# Patient Record
(demographics unavailable — no encounter records)

---

## 2025-05-27 NOTE — HISTORY OF PRESENT ILLNESS
[FreeTextEntry1] : annual cpe [de-identified] : 48 yo  female saw GYN for perimenopause, planning to start HRT will schedule colonoscopy for over the summer JACKELYN recently dx with progressive breast can going to start progesterone ela casillas

## 2025-05-27 NOTE — HEALTH RISK ASSESSMENT
[Very Good] : ~his/her~  mood as very good [2 - 4 times a month (2 pts)] : 2-4 times a month (2 points) [1 or 2 (0 pts)] : 1 or 2 (0 points) [Never (0 pts)] : Never (0 points) [No] : In the past 12 months have you used drugs other than those required for medical reasons? No [No falls in past year] : Patient reported no falls in the past year [0] : 2) Feeling down, depressed, or hopeless: Not at all (0) [PHQ-2 Negative - No further assessment needed] : PHQ-2 Negative - No further assessment needed [Never] : Never [NO] : No [Patient reported mammogram was normal] : Patient reported mammogram was normal [Patient reported PAP Smear was normal] : Patient reported PAP Smear was normal [HIV test declined] : HIV test declined [Hepatitis C test declined] : Hepatitis C test declined [Graduate School] : graduate school [] :  [Sexually Active] : sexually active [Feels Safe at Home] : Feels safe at home [Fully functional (bathing, dressing, toileting, transferring, walking, feeding)] : Fully functional (bathing, dressing, toileting, transferring, walking, feeding) [Fully functional (using the telephone, shopping, preparing meals, housekeeping, doing laundry, using] : Fully functional and needs no help or supervision to perform IADLs (using the telephone, shopping, preparing meals, housekeeping, doing laundry, using transportation, managing medications and managing finances) [Reports normal functional visual acuity (ie: able to read med bottle)] : Reports normal functional visual acuity [# Of Children ___] : has [unfilled] children [Audit-CScore] : 2 [de-identified] : active [de-identified] : healthy [NDK1Upxpy] : 0 [Change in mental status noted] : No change in mental status noted [High Risk Behavior] : no high risk behavior [Reports changes in hearing] : Reports no changes in hearing [Reports changes in vision] : Reports no changes in vision [MammogramDate] : 2025 [PapSmearDate] : 2025 [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

## 2025-06-06 NOTE — HISTORY OF PRESENT ILLNESS
[FreeTextEntry1] : This is a pleasant 47-year-old female who requested a telehealth consult for screening colonoscopy. She denies any significant past medical or surgical history. She denies ever having a lower GI evaluation before. She denies any family history of colon cancer or polyps. She reports that her maternal grandmother  from cancer but is unsure if it was colon cancer. She denies any change in bowel habits. She denies any diarrhea or constipation. She denies any rectal bleeding or melena. She denies any abdominal pain, unintentional weight loss or history of anemia. She denies any upper GI symptoms. She denies the use of any prescription medications; however, she does take over the counter vitamins.

## 2025-06-06 NOTE — ASSESSMENT
[FreeTextEntry1] : This is a pleasant 47-year-old female who requested a telehealth consult for screening colonoscopy. She denies any significant past medical or surgical history. She denies ever having a lower GI evaluation before. She denies any family history of colon cancer or polyps. She reports that her maternal grandmother  from cancer but is unsure if it was colon cancer. She denies any change in bowel habits. She denies any diarrhea or constipation. She denies any rectal bleeding or melena. She denies any abdominal pain, unintentional weight loss or history of anemia. She denies any upper GI symptoms. She denies the use of any prescription medications; however, she does take over the counter vitamins.   Mrs. Burks is an appropriate candidate for a screening colonoscopy. I explained to her the risks, alternatives, and benefits to a colonoscopy. Risk including but not limited to bleeding, perforation, infection and adverse medication reaction. I informed her that I will be sending a prescription for colon prep Suprep to the pharmacy on file. I also discussed with her diet and colon prep instructions prior to the procedure. All instruction will be sent to the patient. Questions were answered. She stated understanding to the above and is agreeable to proceed with the planned procedure. Patient advised to call back the office if Suprep is not covered by her insurance so that an alternative can be prescribed, or with any further questions or concerns.

## 2025-06-06 NOTE — REASON FOR VISIT
[Home] : at home, [unfilled] , at the time of the visit. [Medical Office: ([facility])___] : at the medical office located in  [Telehealth (audio & video)] : This visit was provided via telehealth using real-time 2-way audio visual technology. [Verbal consent obtained from patient] : the patient, [unfilled] [Consultation] : a consultation visit

## 2025-07-21 NOTE — HISTORY OF PRESENT ILLNESS
[FreeTextEntry1] : This is a 47 year old woman in perimenopause with mood symptoms here for consideration of HT for mood symptoms of menopause in setting of elevated cholesterol and a FH of high cholesterol.  brother with high cholesterol- diagnosed in 30's and possibly early 40s.   In the setting of ambulating with no limitations and very active exercise regimen (strength training 3-4 times per week, Pilates 2 times per week, and walking 10K steps per day), there is no CP, WHITNEY, orthopnea, edema, numbness, tingling, nausea, vomiting, diaphoresis or pre-syncope.  She has no other medical issues, on progesterone.   5/25- Total cholesterol- 230 HDL-81 TG-84 LDL- 132 Non HDL- 149  # CVD risk stratification- In setting of LDL and FH of dyslipidemia, will obtain a CAC to further risk stratify. # High LDL- she went from vegetarian to eating meat and dairy in last few years.  We discussed lowering dietary sources of saturated fat including egg yolks, full fat dairy yogurt and mild, and also decreasing red meat in diet. She will do this and we can remeasure fasting lipids in 3 months.  # Menopause- If CAC =0, feel it is reasonable to do HT for symptoms, would limit course to 5 years at most as risk of CVD goes up with advanced age.